# Patient Record
Sex: FEMALE | Race: WHITE | NOT HISPANIC OR LATINO | Employment: OTHER | URBAN - METROPOLITAN AREA
[De-identification: names, ages, dates, MRNs, and addresses within clinical notes are randomized per-mention and may not be internally consistent; named-entity substitution may affect disease eponyms.]

---

## 2019-03-29 ENCOUNTER — TELEPHONE (OUTPATIENT)
Dept: PRIMARY CARE CLINIC | Facility: CLINIC | Age: 84
End: 2019-03-29

## 2019-04-01 RX ORDER — SERTRALINE HYDROCHLORIDE 50 MG/1
50 TABLET, FILM COATED ORAL DAILY
Qty: 30 TABLET | Refills: 2 | Status: SHIPPED | OUTPATIENT
Start: 2019-04-01 | End: 2019-07-03 | Stop reason: SDUPTHER

## 2019-05-16 RX ORDER — DIAZEPAM 5 MG/1
TABLET ORAL
Qty: 90 TABLET | Refills: 0 | Status: SHIPPED | OUTPATIENT
Start: 2019-05-16 | End: 2019-07-03 | Stop reason: SDUPTHER

## 2019-07-03 RX ORDER — MIRTAZAPINE 7.5 MG/1
7.5 TABLET, FILM COATED ORAL NIGHTLY
COMMUNITY
Start: 2019-06-08 | End: 2019-07-03 | Stop reason: SDUPTHER

## 2019-07-08 RX ORDER — DIAZEPAM 5 MG/1
5 TABLET ORAL 2 TIMES DAILY PRN
Qty: 60 TABLET | Refills: 5 | Status: SHIPPED | OUTPATIENT
Start: 2019-07-08 | End: 2019-07-29 | Stop reason: SDUPTHER

## 2019-07-08 RX ORDER — MIRTAZAPINE 7.5 MG/1
7.5 TABLET, FILM COATED ORAL NIGHTLY
Qty: 30 TABLET | Refills: 11 | Status: SHIPPED | OUTPATIENT
Start: 2019-07-08 | End: 2019-07-29 | Stop reason: SDUPTHER

## 2019-07-08 RX ORDER — SERTRALINE HYDROCHLORIDE 50 MG/1
50 TABLET, FILM COATED ORAL DAILY
Qty: 30 TABLET | Refills: 11 | Status: SHIPPED | OUTPATIENT
Start: 2019-07-08 | End: 2019-10-02 | Stop reason: SDUPTHER

## 2019-07-30 RX ORDER — DIAZEPAM 5 MG/1
5 TABLET ORAL 2 TIMES DAILY PRN
Qty: 60 TABLET | Refills: 5 | Status: SHIPPED | OUTPATIENT
Start: 2019-07-30 | End: 2020-01-16

## 2019-07-30 RX ORDER — MIRTAZAPINE 7.5 MG/1
7.5 TABLET, FILM COATED ORAL NIGHTLY
Qty: 30 TABLET | Refills: 11 | Status: SHIPPED | OUTPATIENT
Start: 2019-07-30 | End: 2019-07-31 | Stop reason: SDUPTHER

## 2019-08-01 RX ORDER — MIRTAZAPINE 7.5 MG/1
TABLET, FILM COATED ORAL
Qty: 30 TABLET | Refills: 0 | Status: SHIPPED | OUTPATIENT
Start: 2019-08-01 | End: 2019-08-24 | Stop reason: SDUPTHER

## 2019-08-01 RX ORDER — MIRTAZAPINE 7.5 MG/1
7.5 TABLET, FILM COATED ORAL NIGHTLY
Qty: 30 TABLET | Refills: 0 | Status: CANCELLED | OUTPATIENT
Start: 2019-08-01

## 2019-08-26 RX ORDER — MIRTAZAPINE 7.5 MG/1
TABLET, FILM COATED ORAL
Qty: 90 TABLET | Refills: 1 | Status: SHIPPED | OUTPATIENT
Start: 2019-08-26 | End: 2020-02-19 | Stop reason: SDUPTHER

## 2019-10-02 RX ORDER — METOPROLOL TARTRATE 100 MG/1
TABLET ORAL
COMMUNITY
Start: 2019-08-22 | End: 2019-10-02 | Stop reason: SDUPTHER

## 2019-10-03 RX ORDER — SERTRALINE HYDROCHLORIDE 50 MG/1
50 TABLET, FILM COATED ORAL DAILY
Qty: 90 TABLET | Refills: 0 | Status: SHIPPED | OUTPATIENT
Start: 2019-10-03 | End: 2020-09-21

## 2019-10-03 RX ORDER — METOPROLOL TARTRATE 100 MG/1
100 TABLET ORAL 2 TIMES DAILY
Qty: 180 TABLET | Refills: 0 | Status: SHIPPED | OUTPATIENT
Start: 2019-10-03 | End: 2020-01-06

## 2019-10-21 RX ORDER — SERTRALINE HYDROCHLORIDE 25 MG/1
TABLET, FILM COATED ORAL
Qty: 90 TABLET | Refills: 0 | Status: SHIPPED | OUTPATIENT
Start: 2019-10-21 | End: 2020-01-15

## 2019-11-18 RX ORDER — LISINOPRIL 20 MG/1
TABLET ORAL
Qty: 180 TABLET | Refills: 0 | Status: SHIPPED | OUTPATIENT
Start: 2019-11-18 | End: 2020-02-20

## 2019-12-12 ENCOUNTER — TELEPHONE (OUTPATIENT)
Dept: FAMILY MEDICINE | Facility: CLINIC | Age: 84
End: 2019-12-12

## 2019-12-12 NOTE — TELEPHONE ENCOUNTER
Pt's grandson called to advise the patient went to the ER night before last. She has a UTI, hemorroids, and constipated and a sore on her buttocks. He is asking for something for relief of her pain until she see's you on  Monday for a f/u visit. He states the patient was given a 2 day supply of pain meds. He is asking if it could be called in as a liquid.

## 2019-12-13 ENCOUNTER — TELEPHONE (OUTPATIENT)
Dept: FAMILY MEDICINE | Facility: CLINIC | Age: 84
End: 2019-12-13

## 2019-12-13 DIAGNOSIS — R52 PAIN: Primary | ICD-10-CM

## 2020-01-06 RX ORDER — METOPROLOL TARTRATE 100 MG/1
TABLET ORAL
Qty: 180 TABLET | Refills: 0 | Status: SHIPPED | OUTPATIENT
Start: 2020-01-06 | End: 2020-03-28 | Stop reason: SDUPTHER

## 2020-01-15 RX ORDER — SERTRALINE HYDROCHLORIDE 25 MG/1
TABLET, FILM COATED ORAL
Qty: 90 TABLET | Refills: 0 | Status: SHIPPED | OUTPATIENT
Start: 2020-01-15 | End: 2020-03-28 | Stop reason: SDUPTHER

## 2020-01-16 RX ORDER — DIAZEPAM 5 MG/1
TABLET ORAL
Qty: 60 TABLET | Refills: 4 | Status: SHIPPED | OUTPATIENT
Start: 2020-01-16 | End: 2020-08-12 | Stop reason: SDUPTHER

## 2020-02-19 RX ORDER — MIRTAZAPINE 7.5 MG/1
7.5 TABLET, FILM COATED ORAL NIGHTLY
Qty: 90 TABLET | Refills: 1 | Status: SHIPPED | OUTPATIENT
Start: 2020-02-19 | End: 2020-08-11

## 2020-02-20 RX ORDER — LISINOPRIL 20 MG/1
TABLET ORAL
Qty: 180 TABLET | Refills: 0 | Status: SHIPPED | OUTPATIENT
Start: 2020-02-20 | End: 2020-06-15

## 2020-03-28 RX ORDER — METOPROLOL TARTRATE 100 MG/1
TABLET ORAL
Qty: 180 TABLET | Refills: 0 | Status: SHIPPED | OUTPATIENT
Start: 2020-03-28 | End: 2020-06-26

## 2020-03-28 RX ORDER — SERTRALINE HYDROCHLORIDE 25 MG/1
TABLET, FILM COATED ORAL
Qty: 90 TABLET | Refills: 0 | Status: SHIPPED | OUTPATIENT
Start: 2020-03-28 | End: 2020-06-15

## 2020-08-12 DIAGNOSIS — F41.1 ANXIETY STATE: Primary | ICD-10-CM

## 2020-08-12 PROBLEM — R53.81 MALAISE AND FATIGUE: Status: ACTIVE | Noted: 2020-08-12

## 2020-08-12 PROBLEM — M19.90 OSTEOARTHRITIS: Status: ACTIVE | Noted: 2020-08-12

## 2020-08-12 PROBLEM — I63.9 CEREBROVASCULAR ACCIDENT: Status: ACTIVE | Noted: 2020-08-12

## 2020-08-12 PROBLEM — I10 BENIGN ESSENTIAL HYPERTENSION: Status: ACTIVE | Noted: 2020-08-12

## 2020-08-12 PROBLEM — E03.9 HYPOTHYROIDISM: Status: ACTIVE | Noted: 2020-08-12

## 2020-08-12 PROBLEM — M62.81 MUSCLE WEAKNESS: Status: ACTIVE | Noted: 2020-08-12

## 2020-08-12 PROBLEM — R53.83 MALAISE AND FATIGUE: Status: ACTIVE | Noted: 2020-08-12

## 2020-08-12 PROBLEM — I25.10 CORONARY ARTERIOSCLEROSIS IN NATIVE ARTERY: Status: ACTIVE | Noted: 2020-08-12

## 2020-08-12 PROBLEM — H91.90 DECREASED HEARING: Status: ACTIVE | Noted: 2020-08-12

## 2020-08-12 PROBLEM — K21.9 GASTROESOPHAGEAL REFLUX DISEASE: Status: ACTIVE | Noted: 2020-08-12

## 2020-08-12 PROBLEM — K62.5 HEMORRHAGE OF RECTUM AND ANUS: Status: ACTIVE | Noted: 2020-08-12

## 2020-08-12 PROBLEM — E78.00 PURE HYPERCHOLESTEROLEMIA: Status: ACTIVE | Noted: 2020-08-12

## 2020-08-12 RX ORDER — DIAZEPAM 5 MG/1
5 TABLET ORAL 2 TIMES DAILY PRN
Qty: 60 TABLET | Refills: 5 | Status: SHIPPED | OUTPATIENT
Start: 2020-08-12

## 2020-10-01 ENCOUNTER — TELEPHONE (OUTPATIENT)
Dept: FAMILY MEDICINE | Facility: CLINIC | Age: 85
End: 2020-10-01

## 2020-10-01 NOTE — TELEPHONE ENCOUNTER
Advised she can come tomorrow to see the NP. Son verbalized understanding. Dementia/CHECK UP. Son also advised to bring in all medications.       ----- Message from Marjorie Castillo sent at 10/1/2020  3:44 PM CDT -----  Regarding: Sooner Apoointment Request  Type:  Sooner Apoointment Request    Caller is requesting a sooner appointment.  Caller declined first available appointment listed below.  Caller will not accept being placed on the waitlist and is requesting a message be sent to doctor.  Name of Caller: Luiz Sebastian  When is the first available appointment? 10/14/20  Symptoms: annual  Would the patient rather a call back or a response via [facility]? Call back  Best Call Back Number: [phone] or [phone]  Additional Information:  pt. ,.bname need to be seen tomorrow if possible

## 2020-10-02 ENCOUNTER — OFFICE VISIT (OUTPATIENT)
Dept: FAMILY MEDICINE | Facility: CLINIC | Age: 85
End: 2020-10-02
Payer: MEDICARE

## 2020-10-02 VITALS
HEART RATE: 64 BPM | HEIGHT: 69 IN | DIASTOLIC BLOOD PRESSURE: 78 MMHG | OXYGEN SATURATION: 96 % | SYSTOLIC BLOOD PRESSURE: 150 MMHG

## 2020-10-02 DIAGNOSIS — R13.10 DYSPHAGIA, UNSPECIFIED TYPE: Primary | ICD-10-CM

## 2020-10-02 DIAGNOSIS — Z99.3 WHEELCHAIR BOUND: ICD-10-CM

## 2020-10-02 DIAGNOSIS — K59.00 CONSTIPATION, UNSPECIFIED CONSTIPATION TYPE: ICD-10-CM

## 2020-10-02 DIAGNOSIS — M62.81 MUSCLE WEAKNESS: ICD-10-CM

## 2020-10-02 DIAGNOSIS — F41.1 ANXIETY STATE: ICD-10-CM

## 2020-10-02 DIAGNOSIS — T17.308A CHOKING, INITIAL ENCOUNTER: ICD-10-CM

## 2020-10-02 DIAGNOSIS — R53.1 WEAKNESS: ICD-10-CM

## 2020-10-02 DIAGNOSIS — E03.9 HYPOTHYROIDISM, UNSPECIFIED TYPE: ICD-10-CM

## 2020-10-02 DIAGNOSIS — I10 BENIGN ESSENTIAL HYPERTENSION: ICD-10-CM

## 2020-10-02 DIAGNOSIS — Z86.73 HISTORY OF CVA (CEREBROVASCULAR ACCIDENT): ICD-10-CM

## 2020-10-02 PROCEDURE — 99214 OFFICE O/P EST MOD 30 MIN: CPT | Mod: S$GLB,,, | Performed by: NURSE PRACTITIONER

## 2020-10-02 PROCEDURE — 99214 PR OFFICE/OUTPT VISIT, EST, LEVL IV, 30-39 MIN: ICD-10-PCS | Mod: S$GLB,,, | Performed by: NURSE PRACTITIONER

## 2020-10-02 RX ORDER — POLYETHYLENE GLYCOL 3350 17 G/17G
17 POWDER, FOR SOLUTION ORAL DAILY PRN
Qty: 100 EACH | Refills: 0 | Status: SHIPPED | OUTPATIENT
Start: 2020-10-02 | End: 2021-01-10

## 2020-10-02 RX ORDER — CLONIDINE HYDROCHLORIDE 0.1 MG/1
0.1 TABLET ORAL 2 TIMES DAILY
COMMUNITY

## 2020-10-02 RX ORDER — SERTRALINE HYDROCHLORIDE 50 MG/1
50 TABLET, FILM COATED ORAL DAILY
Qty: 90 TABLET | Refills: 1 | Status: SHIPPED | OUTPATIENT
Start: 2020-10-02 | End: 2021-03-31

## 2020-10-02 NOTE — PROGRESS NOTES
"Clinic Note  10/2/2020      Subjective:       Patient ID:  Karlos is a 89 y.o. female being seen in office today as an established patient.     Chief Complaint: Medication Refill (miralax), Constipation (Last BM 3-4 days ago and small stools there after), and Follow-up (States the only thing she can get down her throat is Ensure(chokes on it so it comes throught her nose), mashed potatoes and cannot swallow any more. )    Mrs. Sebastian is a pt of Dr. Elias but new to me. She is here today with grandson at side who is her caregiver and primary historian. She is here today for a "check-up". Hx of CVA, HTN, dementia, anxiety, and chronic constipation. She did have [facility] with PT at the home but needs new referral.     Hx of CVA-last stroke in 2015 and grandson says this is the last time she ambulated. She is wheelchair bound and cannot transfer on her own. Grandson is requesting new wheelchair, hers is old and beat up. Would like PT referral to help her increase strength.     Difficultly swallowing/choking-pt only can tolerate thickened liquids, mashed potatoes, and ensure. Recently choked around 2 weeks ago on Ensure when "it came up from her nose". Grandson is afraid she might have aspirated but they did not seek emergency care or have CXR done.     HTN-compliant with B/P meds. B/P appropriate for her age in office today    Anxiety/depression-compliant with Zoloft and Valium PRN    Constipation-grandalexsandra says she goes 3-4 days without BM and when she does go it is very "large and hard". Does take Miralax with some relief of symptoms, needs refill.          History reviewed. No pertinent family history.  Past Surgical History:   Procedure Laterality Date    APPENDECTOMY      HYSTERECTOMY       Social History     Substance and Sexual Activity   Alcohol Use Never    Frequency: Never     Statins-hmg-coa reductase inhibitors  Medication List with Changes/Refills   Current Medications    CLONIDINE " (CATAPRES) 0.1 MG TABLET    Take 0.1 mg by mouth 2 (two) times daily.    DIAZEPAM (VALIUM) 5 MG TABLET    Take 1 tablet (5 mg total) by mouth 2 (two) times daily as needed.    DOCUSATE SODIUM (STOOL SOFTENER ORAL)    Take by mouth.    LISINOPRIL (PRINIVIL,ZESTRIL) 20 MG TABLET    TAKE TWO TABLETS BY MOUTH DAILY    METOPROLOL TARTRATE (LOPRESSOR) 100 MG TABLET    TAKE ONE TABLET BY MOUTH TWICE A DAY    MIRTAZAPINE (REMERON) 7.5 MG TAB    TAKE ONE TABLET BY MOUTH EVERY NIGHT AT BEDTIME    MV-MN/IRON/FOLIC ACID/HERB 190 (VITAMIN D3 COMPLETE ORAL)    Take by mouth.    SERTRALINE (ZOLOFT) 25 MG TABLET    TAKE ONE TABLET BY MOUTH DAILY   Changed and/or Refilled Medications    Modified Medication Previous Medication    POLYETHYLENE GLYCOL (MIRALAX) 17 GRAM PWPK polyethylene glycol 3350 (MIRALAX ORAL)       Take 17 g by mouth daily as needed.    Take by mouth.    SERTRALINE (ZOLOFT) 50 MG TABLET sertraline (ZOLOFT) 50 MG tablet       Take 1 tablet (50 mg total) by mouth once daily.    TAKE ONE TABLET BY MOUTH DAILY       Review of Systems   Constitutional: Negative for activity change, fatigue, fever and unexpected weight change.   HENT: Positive for trouble swallowing. Negative for congestion, ear pain, postnasal drip, rhinorrhea, sinus pressure, sinus pain and sore throat.    Eyes: Negative for photophobia, redness and visual disturbance.   Respiratory: Positive for choking. Negative for cough, shortness of breath and wheezing.    Cardiovascular: Negative for chest pain, palpitations and leg swelling.   Gastrointestinal: Positive for constipation. Negative for abdominal pain, blood in stool, diarrhea, nausea and vomiting.   Genitourinary: Negative for dysuria, flank pain and frequency.   Musculoskeletal: Negative for gait problem.   Skin: Negative for color change and rash.   Neurological: Positive for tremors and weakness. Negative for dizziness, seizures, syncope, speech difficulty and headaches.   Hematological:  "Negative for adenopathy.   Psychiatric/Behavioral: Negative for confusion, decreased concentration, sleep disturbance and suicidal ideas. The patient is not nervous/anxious and is not hyperactive.               BP (!) 150/78 (BP Location: Right arm, Patient Position: Sitting, BP Method: Large (Manual))   Pulse 64   Ht 5' 9" (1.753 m)   SpO2 96%   There is no height or weight on file to calculate BMI.    Objective:        Physical Exam  Constitutional:       Appearance: She is underweight.      Comments: Elderly female, wheelchair bound   HENT:      Head: Normocephalic and atraumatic.      Nose: Nose normal.      Mouth/Throat:      Pharynx: Uvula midline.   Eyes:      Conjunctiva/sclera: Conjunctivae normal.      Pupils: Pupils are equal, round, and reactive to light.   Neck:      Musculoskeletal: Normal range of motion.      Thyroid: No thyroid mass or thyromegaly.      Vascular: No carotid bruit or JVD.      Trachea: Trachea normal.   Cardiovascular:      Rate and Rhythm: Normal rate and regular rhythm.      Heart sounds: Normal heart sounds. No murmur. No friction rub. No gallop.    Pulmonary:      Effort: Pulmonary effort is normal. No respiratory distress.      Breath sounds: Normal breath sounds. No stridor. No wheezing, rhonchi or rales.   Abdominal:      General: Bowel sounds are decreased. There is no distension or abdominal bruit.      Palpations: Abdomen is soft. There is no mass.      Tenderness: There is no abdominal tenderness. There is no guarding.   Musculoskeletal: Normal range of motion.         General: No deformity.      Comments: Left hand contracted   Lymphadenopathy:      Cervical: No cervical adenopathy.   Skin:     General: Skin is warm and dry.      Capillary Refill: Capillary refill takes less than 2 seconds.      Findings: No rash.   Neurological:      Mental Status: She is alert. She is confused.      Sensory: No sensory deficit.      Motor: Weakness, tremor and atrophy present. "   Psychiatric:         Attention and Perception: She is inattentive.         Mood and Affect: Mood is not anxious or depressed.         Speech: Speech normal.         Behavior: Behavior is cooperative.         Thought Content: Thought content does not include homicidal or suicidal ideation. Thought content does not include homicidal or suicidal plan.         Cognition and Memory: Cognition is impaired. Memory is impaired. She exhibits impaired recent memory and impaired remote memory.            Assessment and Plan:         Karlos was seen today for medication refill, constipation and follow-up.    Diagnoses and all orders for this visit:    Dysphagia, unspecified type  -     Ambulatory referral/consult to Gastroenterology; Future  -     X-Ray Chest PA And Lateral; Future    Choking, initial encounter  -     X-Ray Chest PA And Lateral; Future    Weakness  -     WHEELCHAIR FOR HOME USE    Benign essential hypertension  -     CBC auto differential; Future  -     Comprehensive metabolic panel; Future  -     CBC auto differential  -     Comprehensive metabolic panel    Muscle weakness  -     Ambulatory referral/consult to Home Health; Future    History of CVA (cerebrovascular accident)  -     WHEELCHAIR FOR HOME USE    Wheelchair bound  -     WHEELCHAIR FOR HOME USE    Anxiety state  -     sertraline (ZOLOFT) 50 MG tablet; Take 1 tablet (50 mg total) by mouth once daily.    Hypothyroidism, unspecified type  -     TSH; Future  -     TSH    Constipation, unspecified constipation type  -     polyethylene glycol (MIRALAX) 17 gram PwPk; Take 17 g by mouth daily as needed.    Pt would like home health to do labs and CXR at home so he does not have to expose pt to hospital or other outpt setting.   Instructed if he has not heard from GI or HH in 5-7 business days to follow-up with our office.  Linzess 72mcq samples given to try for constipation. Do not take in addition to Miralax, one or other.       Follow up:   No  follow-ups on file.            Amelia Pate, NP

## 2020-10-07 PROCEDURE — G0180 PR HOME HEALTH MD CERTIFICATION: ICD-10-PCS | Mod: ,,, | Performed by: FAMILY MEDICINE

## 2020-10-07 PROCEDURE — G0180 MD CERTIFICATION HHA PATIENT: HCPCS | Mod: ,,, | Performed by: FAMILY MEDICINE

## 2020-10-21 ENCOUNTER — DOCUMENT SCAN (OUTPATIENT)
Dept: HOME HEALTH SERVICES | Facility: HOSPITAL | Age: 85
End: 2020-10-21
Payer: MEDICARE

## 2020-10-22 ENCOUNTER — PATIENT MESSAGE (OUTPATIENT)
Dept: FAMILY MEDICINE | Facility: CLINIC | Age: 85
End: 2020-10-22

## 2020-10-26 ENCOUNTER — TELEPHONE (OUTPATIENT)
Dept: FAMILY MEDICINE | Facility: CLINIC | Age: 85
End: 2020-10-26

## 2020-10-26 DIAGNOSIS — R13.10 DYSPHAGIA, UNSPECIFIED TYPE: ICD-10-CM

## 2020-10-26 DIAGNOSIS — K59.00 CONSTIPATION, UNSPECIFIED CONSTIPATION TYPE: Primary | ICD-10-CM

## 2020-10-26 NOTE — TELEPHONE ENCOUNTER
Tsering     ----- Message from Betty Elias MD sent at 10/26/2020  8:46 AM CDT -----  I printed the speech therapy ref and x-ray that  requested

## 2020-10-30 ENCOUNTER — EXTERNAL HOME HEALTH (OUTPATIENT)
Dept: HOME HEALTH SERVICES | Facility: HOSPITAL | Age: 85
End: 2020-10-30
Payer: MEDICARE

## 2020-11-03 ENCOUNTER — DOCUMENT SCAN (OUTPATIENT)
Dept: HOME HEALTH SERVICES | Facility: HOSPITAL | Age: 85
End: 2020-11-03
Payer: MEDICARE

## 2020-11-05 ENCOUNTER — DOCUMENT SCAN (OUTPATIENT)
Dept: HOME HEALTH SERVICES | Facility: HOSPITAL | Age: 85
End: 2020-11-05
Payer: MEDICARE

## 2020-11-10 ENCOUNTER — TELEPHONE (OUTPATIENT)
Dept: FAMILY MEDICINE | Facility: CLINIC | Age: 85
End: 2020-11-10

## 2020-11-10 NOTE — TELEPHONE ENCOUNTER
I did speak with someone in this office on last week to let them know that there is a new system that [facility] has us to follow. I have now put in an email to check on the status of these orders.      ----- Message from Evon Parker sent at 11/10/2020  1:54 PM CST -----  Regarding: order request  Contact: Isabelle/ [facility]  States that she is calling to check status on order request for modified barium swallow. States that request was faxed over on 11/04/20 and 11/09/20. Please fax order to [phone]. Please call back at [phone]//thank you acc

## 2020-12-09 ENCOUNTER — TELEPHONE (OUTPATIENT)
Dept: FAMILY MEDICINE | Facility: CLINIC | Age: 85
End: 2020-12-09

## 2020-12-09 NOTE — TELEPHONE ENCOUNTER
Razia states since swallow study, is now unable to swallow food or liquids.  /94 today.   No BM with miralax today watery stool. Has not been herself. Spaces out.  nurse went out and the patient was headed out to the ER per ambulance. ANALIA      ----- Message from Dominique Houston sent at 12/9/2020 11:39 AM CST -----  Contact: [facility]  Requesting call back regarding speaking with nurse about pt medication, high blood pressure and GI issues. Please call back at [phone].

## 2020-12-11 ENCOUNTER — PATIENT MESSAGE (OUTPATIENT)
Dept: OTHER | Facility: OTHER | Age: 85
End: 2020-12-11

## 2020-12-29 ENCOUNTER — TELEPHONE (OUTPATIENT)
Dept: FAMILY MEDICINE | Facility: CLINIC | Age: 85
End: 2020-12-29

## 2020-12-29 DIAGNOSIS — S51.002A OPEN WOUND OF LEFT ELBOW, INITIAL ENCOUNTER: Primary | ICD-10-CM

## 2020-12-29 NOTE — TELEPHONE ENCOUNTER
Ms Calle with [facility] called to ask for an order for wound care on the buttocks. Family was using octifoam and she would like to cover with hydrocolloid. She has two openings about 0.2 to the buttocks.     The elbow has 3 wounds with about 0.2-0.3 width and length. A little yellow slough(50-60 %) and would like to use the silver alginate and cover with a foam dressing.     Will need order faxed to the  .      ----- Message from Dominique Houston sent at 12/29/2020  3:48 PM CST -----  Contact: [facility]  Requesting call back regarding getting a new order for wound care for pts bottom and also to speak with nurse about pt wound on elbow. Please call back at [phone].

## 2020-12-29 NOTE — TELEPHONE ENCOUNTER
Grandson called about wound care and I will fax to  now.       ----- Message from Maylin Moore sent at 12/29/2020  9:49 AM CST -----  Type:  Needs Medical Advice    Who Called: [facility] (Jessi)  Symptoms (please be specific): wound    How long has patient had these symptoms: ongoing   Pharmacy name and phone #:    Would the patient rather a call back or a response via [facility]? Callback   Best Call Back Number: [phone]  Additional Information:

## 2020-12-30 DIAGNOSIS — S31.000A WOUND OF SACRAL REGION, INITIAL ENCOUNTER: Primary | ICD-10-CM

## 2021-01-11 ENCOUNTER — TELEPHONE (OUTPATIENT)
Dept: FAMILY MEDICINE | Facility: CLINIC | Age: 86
End: 2021-01-11

## 2021-01-12 RX ORDER — POLYETHYLENE GLYCOL 3350 17 G/17G
17 POWDER, FOR SOLUTION ORAL DAILY
Qty: 595 G | Refills: 0 | Status: SHIPPED | OUTPATIENT
Start: 2021-01-12 | End: 2021-04-12

## 2021-01-12 RX ORDER — DOCUSATE SODIUM 100 MG/1
100 CAPSULE, LIQUID FILLED ORAL 2 TIMES DAILY
Qty: 60 CAPSULE | Refills: 2 | Status: SHIPPED | OUTPATIENT
Start: 2021-01-12

## 2021-01-15 ENCOUNTER — TELEPHONE (OUTPATIENT)
Dept: FAMILY MEDICINE | Facility: CLINIC | Age: 86
End: 2021-01-15

## 2021-01-19 ENCOUNTER — TELEPHONE (OUTPATIENT)
Dept: FAMILY MEDICINE | Facility: CLINIC | Age: 86
End: 2021-01-19

## 2021-01-27 ENCOUNTER — TELEPHONE (OUTPATIENT)
Dept: FAMILY MEDICINE | Facility: CLINIC | Age: 86
End: 2021-01-27

## 2021-03-22 RX ORDER — LISINOPRIL 20 MG/1
TABLET ORAL
Qty: 180 TABLET | Refills: 0 | Status: SHIPPED | OUTPATIENT
Start: 2021-03-22

## 2021-03-22 RX ORDER — MIRTAZAPINE 7.5 MG/1
TABLET, FILM COATED ORAL
Qty: 90 TABLET | Refills: 0 | Status: SHIPPED | OUTPATIENT
Start: 2021-03-22

## 2021-03-22 RX ORDER — SERTRALINE HYDROCHLORIDE 25 MG/1
TABLET, FILM COATED ORAL
Qty: 90 TABLET | Refills: 0 | OUTPATIENT
Start: 2021-03-22

## 2021-03-22 RX ORDER — METOPROLOL TARTRATE 100 MG/1
TABLET ORAL
Qty: 180 TABLET | Refills: 0 | Status: SHIPPED | OUTPATIENT
Start: 2021-03-22

## 2021-05-12 ENCOUNTER — PATIENT MESSAGE (OUTPATIENT)
Dept: RESEARCH | Facility: HOSPITAL | Age: 86
End: 2021-05-12